# Patient Record
Sex: FEMALE | Race: BLACK OR AFRICAN AMERICAN | HISPANIC OR LATINO | URBAN - METROPOLITAN AREA
[De-identification: names, ages, dates, MRNs, and addresses within clinical notes are randomized per-mention and may not be internally consistent; named-entity substitution may affect disease eponyms.]

---

## 2017-03-21 ENCOUNTER — INPATIENT (INPATIENT)
Facility: HOSPITAL | Age: 77
LOS: 3 days | Discharge: HOME | End: 2017-03-25
Attending: INTERNAL MEDICINE | Admitting: INTERNAL MEDICINE
Payer: MEDICARE

## 2017-03-21 VITALS
DIASTOLIC BLOOD PRESSURE: 109 MMHG | HEART RATE: 61 BPM | RESPIRATION RATE: 20 BRPM | SYSTOLIC BLOOD PRESSURE: 188 MMHG | TEMPERATURE: 98 F

## 2017-03-21 DIAGNOSIS — Z98.49 CATARACT EXTRACTION STATUS, UNSPECIFIED EYE: Chronic | ICD-10-CM

## 2017-03-21 DIAGNOSIS — H40.9 UNSPECIFIED GLAUCOMA: ICD-10-CM

## 2017-03-21 DIAGNOSIS — Z41.9 ENCOUNTER FOR PROCEDURE FOR PURPOSES OTHER THAN REMEDYING HEALTH STATE, UNSPECIFIED: Chronic | ICD-10-CM

## 2017-03-21 DIAGNOSIS — Z41.8 ENCOUNTER FOR OTHER PROCEDURES FOR PURPOSES OTHER THAN REMEDYING HEALTH STATE: ICD-10-CM

## 2017-03-21 DIAGNOSIS — R55 SYNCOPE AND COLLAPSE: ICD-10-CM

## 2017-03-21 DIAGNOSIS — I10 ESSENTIAL (PRIMARY) HYPERTENSION: ICD-10-CM

## 2017-03-21 LAB
ALBUMIN SERPL ELPH-MCNC: 4.3 G/DL — SIGNIFICANT CHANGE UP (ref 3.3–5)
ALP SERPL-CCNC: 88 U/L — SIGNIFICANT CHANGE UP (ref 40–120)
ALT FLD-CCNC: 11 U/L — SIGNIFICANT CHANGE UP (ref 4–33)
APPEARANCE UR: CLEAR — SIGNIFICANT CHANGE UP
APTT BLD: 28 SEC — SIGNIFICANT CHANGE UP (ref 27.5–37.4)
AST SERPL-CCNC: 24 U/L — SIGNIFICANT CHANGE UP (ref 4–32)
BASE EXCESS BLDV CALC-SCNC: 3.8 MMOL/L — SIGNIFICANT CHANGE UP
BASOPHILS # BLD AUTO: 0.03 K/UL — SIGNIFICANT CHANGE UP (ref 0–0.2)
BASOPHILS NFR BLD AUTO: 0.4 % — SIGNIFICANT CHANGE UP (ref 0–2)
BILIRUB SERPL-MCNC: 0.4 MG/DL — SIGNIFICANT CHANGE UP (ref 0.2–1.2)
BILIRUB UR-MCNC: NEGATIVE — SIGNIFICANT CHANGE UP
BLOOD GAS VENOUS - CREATININE: 1.04 MG/DL — SIGNIFICANT CHANGE UP (ref 0.5–1.3)
BLOOD UR QL VISUAL: NEGATIVE — SIGNIFICANT CHANGE UP
BUN SERPL-MCNC: 20 MG/DL — SIGNIFICANT CHANGE UP (ref 7–23)
CALCIUM SERPL-MCNC: 9.6 MG/DL — SIGNIFICANT CHANGE UP (ref 8.4–10.5)
CHLORIDE BLDV-SCNC: 102 MMOL/L — SIGNIFICANT CHANGE UP (ref 96–108)
CHLORIDE SERPL-SCNC: 97 MMOL/L — LOW (ref 98–107)
CK MB BLD-MCNC: 1.37 NG/ML — SIGNIFICANT CHANGE UP (ref 1–4.7)
CK MB BLD-MCNC: SIGNIFICANT CHANGE UP (ref 0–2.5)
CK SERPL-CCNC: 100 U/L — SIGNIFICANT CHANGE UP (ref 25–170)
CO2 SERPL-SCNC: 25 MMOL/L — SIGNIFICANT CHANGE UP (ref 22–31)
COLOR SPEC: SIGNIFICANT CHANGE UP
CREAT SERPL-MCNC: 1.11 MG/DL — SIGNIFICANT CHANGE UP (ref 0.5–1.3)
EOSINOPHIL # BLD AUTO: 0.04 K/UL — SIGNIFICANT CHANGE UP (ref 0–0.5)
EOSINOPHIL NFR BLD AUTO: 0.6 % — SIGNIFICANT CHANGE UP (ref 0–6)
GAS PNL BLDV: 139 MMOL/L — SIGNIFICANT CHANGE UP (ref 136–146)
GLUCOSE BLDV-MCNC: 166 — HIGH (ref 70–99)
GLUCOSE SERPL-MCNC: 159 MG/DL — HIGH (ref 70–99)
GLUCOSE UR-MCNC: NEGATIVE — SIGNIFICANT CHANGE UP
HBA1C BLD-MCNC: 6.2 % — HIGH (ref 4–5.6)
HCO3 BLDV-SCNC: 25 MMOL/L — SIGNIFICANT CHANGE UP (ref 20–27)
HCT VFR BLD CALC: 40.3 % — SIGNIFICANT CHANGE UP (ref 34.5–45)
HCT VFR BLDV CALC: 41.3 % — SIGNIFICANT CHANGE UP (ref 34.5–45)
HGB BLD-MCNC: 13.1 G/DL — SIGNIFICANT CHANGE UP (ref 11.5–15.5)
HGB BLDV-MCNC: 13.4 G/DL — SIGNIFICANT CHANGE UP (ref 11.5–15.5)
HYALINE CASTS # UR AUTO: SIGNIFICANT CHANGE UP (ref 0–?)
IMM GRANULOCYTES NFR BLD AUTO: 0.1 % — SIGNIFICANT CHANGE UP (ref 0–1.5)
INR BLD: 1.07 — SIGNIFICANT CHANGE UP (ref 0.88–1.17)
KETONES UR-MCNC: NEGATIVE — SIGNIFICANT CHANGE UP
LACTATE BLDV-MCNC: 2.1 MMOL/L — HIGH (ref 0.5–2)
LEUKOCYTE ESTERASE UR-ACNC: NEGATIVE — SIGNIFICANT CHANGE UP
LYMPHOCYTES # BLD AUTO: 2.12 K/UL — SIGNIFICANT CHANGE UP (ref 1–3.3)
LYMPHOCYTES # BLD AUTO: 31.2 % — SIGNIFICANT CHANGE UP (ref 13–44)
MCHC RBC-ENTMCNC: 29.5 PG — SIGNIFICANT CHANGE UP (ref 27–34)
MCHC RBC-ENTMCNC: 32.5 % — SIGNIFICANT CHANGE UP (ref 32–36)
MCV RBC AUTO: 90.8 FL — SIGNIFICANT CHANGE UP (ref 80–100)
MONOCYTES # BLD AUTO: 0.42 K/UL — SIGNIFICANT CHANGE UP (ref 0–0.9)
MONOCYTES NFR BLD AUTO: 6.2 % — SIGNIFICANT CHANGE UP (ref 2–14)
MUCOUS THREADS # UR AUTO: SIGNIFICANT CHANGE UP
NEUTROPHILS # BLD AUTO: 4.18 K/UL — SIGNIFICANT CHANGE UP (ref 1.8–7.4)
NEUTROPHILS NFR BLD AUTO: 61.5 % — SIGNIFICANT CHANGE UP (ref 43–77)
NITRITE UR-MCNC: NEGATIVE — SIGNIFICANT CHANGE UP
PCO2 BLDV: 51 MMHG — SIGNIFICANT CHANGE UP (ref 41–51)
PH BLDV: 7.37 PH — SIGNIFICANT CHANGE UP (ref 7.32–7.43)
PH UR: 7 — SIGNIFICANT CHANGE UP (ref 4.6–8)
PLATELET # BLD AUTO: 269 K/UL — SIGNIFICANT CHANGE UP (ref 150–400)
PMV BLD: 11.1 FL — SIGNIFICANT CHANGE UP (ref 7–13)
PO2 BLDV: < 24 MMHG — LOW (ref 35–40)
POTASSIUM BLDV-SCNC: 3.5 MMOL/L — SIGNIFICANT CHANGE UP (ref 3.4–4.5)
POTASSIUM SERPL-MCNC: 4.1 MMOL/L — SIGNIFICANT CHANGE UP (ref 3.5–5.3)
POTASSIUM SERPL-SCNC: 4.1 MMOL/L — SIGNIFICANT CHANGE UP (ref 3.5–5.3)
PROT SERPL-MCNC: 8.2 G/DL — SIGNIFICANT CHANGE UP (ref 6–8.3)
PROT UR-MCNC: NEGATIVE — SIGNIFICANT CHANGE UP
PROTHROM AB SERPL-ACNC: 12 SEC — SIGNIFICANT CHANGE UP (ref 9.8–13.1)
RBC # BLD: 4.44 M/UL — SIGNIFICANT CHANGE UP (ref 3.8–5.2)
RBC # FLD: 12.4 % — SIGNIFICANT CHANGE UP (ref 10.3–14.5)
RBC CASTS # UR COMP ASSIST: SIGNIFICANT CHANGE UP (ref 0–?)
SAO2 % BLDV: 25.8 % — LOW (ref 60–85)
SODIUM SERPL-SCNC: 140 MMOL/L — SIGNIFICANT CHANGE UP (ref 135–145)
SP GR SPEC: 1.01 — SIGNIFICANT CHANGE UP (ref 1–1.03)
SQUAMOUS # UR AUTO: SIGNIFICANT CHANGE UP
TROPONIN T SERPL-MCNC: < 0.06 NG/ML — SIGNIFICANT CHANGE UP (ref 0–0.06)
TROPONIN T SERPL-MCNC: < 0.06 NG/ML — SIGNIFICANT CHANGE UP (ref 0–0.06)
TSH SERPL-MCNC: 1.3 UIU/ML — SIGNIFICANT CHANGE UP (ref 0.27–4.2)
UROBILINOGEN FLD QL: NORMAL E.U. — SIGNIFICANT CHANGE UP (ref 0.1–0.2)
WBC # BLD: 6.8 K/UL — SIGNIFICANT CHANGE UP (ref 3.8–10.5)
WBC # FLD AUTO: 6.8 K/UL — SIGNIFICANT CHANGE UP (ref 3.8–10.5)
WBC UR QL: SIGNIFICANT CHANGE UP (ref 0–?)

## 2017-03-21 PROCEDURE — 71020: CPT | Mod: 26

## 2017-03-21 RX ORDER — ASPIRIN/CALCIUM CARB/MAGNESIUM 324 MG
81 TABLET ORAL DAILY
Qty: 0 | Refills: 0 | Status: DISCONTINUED | OUTPATIENT
Start: 2017-03-21 | End: 2017-03-25

## 2017-03-21 RX ORDER — TIMOLOL 0.5 %
1 DROPS OPHTHALMIC (EYE) EVERY 12 HOURS
Qty: 0 | Refills: 0 | Status: DISCONTINUED | OUTPATIENT
Start: 2017-03-21 | End: 2017-03-21

## 2017-03-21 RX ORDER — TIMOLOL 0.5 %
1 DROPS OPHTHALMIC (EYE) DAILY
Qty: 0 | Refills: 0 | Status: DISCONTINUED | OUTPATIENT
Start: 2017-03-21 | End: 2017-03-25

## 2017-03-21 RX ORDER — BRIMONIDINE TARTRATE, TIMOLOL MALEATE 2; 5 MG/ML; MG/ML
1 SOLUTION/ DROPS OPHTHALMIC
Qty: 0 | Refills: 0 | COMMUNITY

## 2017-03-21 RX ORDER — LATANOPROST 0.05 MG/ML
1 SOLUTION/ DROPS OPHTHALMIC; TOPICAL AT BEDTIME
Qty: 0 | Refills: 0 | Status: DISCONTINUED | OUTPATIENT
Start: 2017-03-21 | End: 2017-03-25

## 2017-03-21 RX ORDER — HYDRALAZINE HCL 50 MG
10 TABLET ORAL ONCE
Qty: 0 | Refills: 0 | Status: COMPLETED | OUTPATIENT
Start: 2017-03-21 | End: 2017-03-21

## 2017-03-21 RX ORDER — HYDRALAZINE HCL 50 MG
5 TABLET ORAL ONCE
Qty: 0 | Refills: 0 | Status: COMPLETED | OUTPATIENT
Start: 2017-03-21 | End: 2017-03-21

## 2017-03-21 RX ORDER — OFLOXACIN 0.3 %
1 DROPS OPHTHALMIC (EYE)
Qty: 1 | Refills: 0 | OUTPATIENT
Start: 2017-03-21 | End: 2017-03-28

## 2017-03-21 RX ORDER — OFLOXACIN 0.3 %
1 DROPS OPHTHALMIC (EYE) DAILY
Qty: 0 | Refills: 0 | Status: DISCONTINUED | OUTPATIENT
Start: 2017-03-21 | End: 2017-03-25

## 2017-03-21 RX ORDER — BRIMONIDINE TARTRATE, TIMOLOL MALEATE 2; 5 MG/ML; MG/ML
1 SOLUTION/ DROPS OPHTHALMIC
Qty: 1 | Refills: 0 | OUTPATIENT
Start: 2017-03-21 | End: 2017-04-20

## 2017-03-21 RX ORDER — OFLOXACIN 0.3 %
1 DROPS OPHTHALMIC (EYE)
Qty: 0 | Refills: 0 | COMMUNITY

## 2017-03-21 RX ORDER — BRIMONIDINE TARTRATE 2 MG/MG
1 SOLUTION/ DROPS OPHTHALMIC DAILY
Qty: 0 | Refills: 0 | Status: DISCONTINUED | OUTPATIENT
Start: 2017-03-21 | End: 2017-03-25

## 2017-03-21 RX ORDER — SODIUM CHLORIDE 9 MG/ML
1000 INJECTION INTRAMUSCULAR; INTRAVENOUS; SUBCUTANEOUS
Qty: 0 | Refills: 0 | Status: COMPLETED | OUTPATIENT
Start: 2017-03-21 | End: 2017-03-21

## 2017-03-21 RX ORDER — AMLODIPINE BESYLATE 2.5 MG/1
5 TABLET ORAL DAILY
Qty: 0 | Refills: 0 | Status: DISCONTINUED | OUTPATIENT
Start: 2017-03-21 | End: 2017-03-24

## 2017-03-21 RX ORDER — ENOXAPARIN SODIUM 100 MG/ML
40 INJECTION SUBCUTANEOUS EVERY 24 HOURS
Qty: 0 | Refills: 0 | Status: DISCONTINUED | OUTPATIENT
Start: 2017-03-21 | End: 2017-03-25

## 2017-03-21 RX ORDER — BRIMONIDINE TARTRATE 2 MG/MG
1 SOLUTION/ DROPS OPHTHALMIC EVERY 12 HOURS
Qty: 0 | Refills: 0 | Status: DISCONTINUED | OUTPATIENT
Start: 2017-03-21 | End: 2017-03-21

## 2017-03-21 RX ADMIN — LATANOPROST 1 DROP(S): 0.05 SOLUTION/ DROPS OPHTHALMIC; TOPICAL at 23:15

## 2017-03-21 RX ADMIN — AMLODIPINE BESYLATE 5 MILLIGRAM(S): 2.5 TABLET ORAL at 22:15

## 2017-03-21 RX ADMIN — Medication 10 MILLIGRAM(S): at 10:16

## 2017-03-21 RX ADMIN — ENOXAPARIN SODIUM 40 MILLIGRAM(S): 100 INJECTION SUBCUTANEOUS at 22:41

## 2017-03-21 RX ADMIN — Medication 5 MILLIGRAM(S): at 22:15

## 2017-03-21 RX ADMIN — SODIUM CHLORIDE 75 MILLILITER(S): 9 INJECTION INTRAMUSCULAR; INTRAVENOUS; SUBCUTANEOUS at 16:45

## 2017-03-21 NOTE — H&P ADULT. - PSH
No significant past surgical history Cataract extraction status, unspecified eye    Elective surgical procedure  glaucoma repair

## 2017-03-21 NOTE — ED ADULT TRIAGE NOTE - CHIEF COMPLAINT QUOTE
Pt states felt dizzy this am and almost passed out/ Pt denies any chest pain or sob. finger stick 134 in triage. Pt has HTN states ran out of medication.

## 2017-03-21 NOTE — H&P ADULT. - HISTORY OF PRESENT ILLNESS
Pt is creole-speaking and insisted history be obtained from her son:    78 y/o female with PMHx of HTN, for which she takes no medication, c/o sudden onset of dizziness and "near-syncopal" episode at 5am this morning. Pt was standing in the kitchen when the dizziness occurred and was caught by her nephew as she was falling and seated onto the kitchen chair where she demonstrated symptoms of lethargy, weakness, and nausea. Pt denies losing consciousness and was alert and able to answer questions during the episode. Pt was brought in by her son at 6:30am when she went to the bathroom and still showed signs of weakness. Pt has since improved and feels "back to normal." Pt denies ever experiencing such symptoms before. Pt denies CP, palpitations, SOB, ROCHA, diaphoresis, HAs, visual changes, fever, cough, chills, vomiting, diarrhea, constipation, dysuria, hematuria, polyuria, hematochezia, and melena. Pt denies recent travel and sick contacts. Pt is creole-speaking and insisted history be obtained from her son/caregiver (Timoteo Butler [phone]):    76 y/o female with PMHx of Glaucoma and Cataracts, does not have PCP established.  c/o sudden onset of dizziness and almost passed out at 5am this morning. Pt was standing in the kitchen when the dizziness occurred and was caught by her nephew as she was falling. She was seated onto the kitchen chair where she demonstrated symptoms of lethargy, weakness, and nausea. Pt denies losing consciousness and was alert and able to answer questions during the episode. Pt was brought in by her son at 6:30am when she went to the bathroom and still showed signs of weakness and unsteadiness on her feet. Pt has since improved and feels "back to normal." Pt denies ever experiencing such symptoms before. Pt denies CP, palpitations, SOB, ROCHA, diaphoresis, HAs, visual changes, fever, cough, chills, vomiting, diarrhea, constipation, dysuria, hematuria, polyuria, hematochezia, and melena. Pt denies recent travel and sick contacts.     In E.D. pt found to be hypertensive with /93. She was given IV Hydralazine 10mg x1 where her BP improved 157/59 and symptoms resolved.

## 2017-03-21 NOTE — H&P ADULT. - MUSCULOSKELETAL
details… detailed exam normal/no joint erythema/normal strength/ROM intact/no calf tenderness/no joint warmth/no joint swelling

## 2017-03-21 NOTE — H&P ADULT. - NEGATIVE CARDIOVASCULAR SYMPTOMS
no paroxysmal nocturnal dyspnea/no claudication/no dyspnea on exertion/no palpitations/no peripheral edema/no chest pain/no orthopnea

## 2017-03-21 NOTE — ED PROVIDER NOTE - MEDICAL DECISION MAKING DETAILS
77F w/ dizziness and EKG with LBBB (no previous for comparison) concerning for cardiac etiology. HEART score at least 6 (pre-troponin). Plan for basic and cardiac labs, EKG, CXR, UA. Admission.

## 2017-03-21 NOTE — ED PROVIDER NOTE - CHPI ED SYMPTOMS NEG
no shortness of breath/no chills/no nausea/no syncope/no vomiting/no fever/no back pain/no diaphoresis/no cough

## 2017-03-21 NOTE — H&P ADULT. - NEGATIVE GENERAL SYMPTOMS
no malaise/no polyphagia/no weight loss/no fever/no weight gain/no chills/no polydipsia/no sweating/no anorexia/no polyuria

## 2017-03-21 NOTE — H&P ADULT. - NEGATIVE ENMT SYMPTOMS
no tinnitus/no vertigo/no nasal discharge/no sinus symptoms/no nose bleeds/no ear pain/no post-nasal discharge/no recurrent cold sores/no nasal obstruction/no hearing difficulty/no nasal congestion

## 2017-03-21 NOTE — ED PROVIDER NOTE - OBJECTIVE STATEMENT
77F w/ hx of uncontrolled HTN p/w acute onset of dizziness, weakness and nausea this AM. Pt developed onset of symptoms while standing making coffee at home. Felt like she was going to faint, and was helped down to a chair by her grandson. Pt appeared to slump over in the chair, but never lost consciousness. No change in routine or baseline function level prior to this episode. Pt denies HA, chest pain, SOB, one sided numbness/weakness, abdominal pain, diarrhea, jaw/arm pain.     Pt has been prescribed HTN med (doesn't remember name) but doesn't take. Pt does not have reg PMD follow up. No previous cardiac or neuro hx. Normally fully functional at home. Now feels weak walking. Never smoker, drinker. No DM hx.

## 2017-03-21 NOTE — H&P ADULT. - NEGATIVE GASTROINTESTINAL SYMPTOMS
no abdominal pain/no flatulence/no nausea/no melena/no hematochezia/no diarrhea/no vomiting/no jaundice/no hiccoughs/no constipation/no steatorrhea/no change in bowel habits

## 2017-03-21 NOTE — ED PROVIDER NOTE - PROGRESS NOTE DETAILS
Pt has remained stable. First set of CE wnl. Will plan to admit to tele for further enzyme trending and stress test. Discussed with Dr. Berkowitz who agreed to accept patient.

## 2017-03-21 NOTE — ED PROVIDER NOTE - ATTENDING CONTRIBUTION TO CARE
Dr. Bae:  I have personally performed a face to face bedside history and physical examination of this patient. I have discussed the history, examination, review of systems, assessment and plan of management with the resident. I have reviewed the electronic medical record and amended it to reflect my history, review of systems, physical exam, assessment and plan.    77F h/o HTN not on meds presents with two near syncopal episodes at home this morning while standing.  Felt lightheaded.  Denies fever/chills, cp, sob, n/v/d.  No prior history of similar episodes.    Exam:  - nad  - rrr  - ctab  - abd soft, ntnd  - no pedal edema    A/P  - near syncope and LBBB on EKG  - patient noted to rafa to 40's while in ED on telemetry  - cbc, cmp, trop  - ekg  - telemetry admit

## 2017-03-21 NOTE — ED ADULT NURSE NOTE - OBJECTIVE STATEMENT
pt received a&ox3, pt brought in by son for near syncopal episode, pt was eating breakfast stood up and felt dizzy and nauseous and felt like she was going to fall and sat down, pt denies cp/sob/vd/urinary sypmtoms, before or after the episode, pt appears comfortable and to be in NAD, md at bedside evaluating, vss as reported, 20 gauge IV placed in right ac, labs drawn and sent, pt placed on monitor, will continue to monitor.

## 2017-03-21 NOTE — H&P ADULT. - ASSESSMENT
78 y/o female c/o of sudden onset dizziness r/o AS 78 yo female p/w generalized weakness, dizziness, lightheadedness and ataxia found to have uncontrolled /93 where symptoms improved after IV hydralazine admitted to tele for near syncope, r/o ACS in the setting of HTN Urgency.     +Near Syncope-->El x2, TTE, IV fluid h ydration  +HTN Urgency 76 yo female p/w generalized weakness, dizziness, lightheadedness and ataxia found to have uncontrolled /93 where symptoms improved after IV hydralazine admitted to tele for near syncope, r/o ACS in the setting of HTN Urgency.     +Near Syncope-->El x2, TTE, IV fluid hydration  +HTN Urgency

## 2017-03-21 NOTE — PATIENT PROFILE ADULT. - VISION (WITH CORRECTIVE LENSES IF THE PATIENT USUALLY WEARS THEM):
R eye blind/Partially impaired: cannot see medication labels or newsprint, but can see obstacles in path, and the surrounding layout; can count fingers at arm's length

## 2017-03-21 NOTE — H&P ADULT. - GASTROINTESTINAL DETAILS
nontender/no distention/no organomegaly/no rigidity/no bruit/no rebound tenderness/soft/normal/no masses palpable/no guarding/bowel sounds normal

## 2017-03-21 NOTE — ED ADULT NURSE REASSESSMENT NOTE - NS ED NURSE REASSESS COMMENT FT1
RN Break Coverage: received report on pt. pt presents awake alert, nonenglish speaking, offers no complaints. skin warm, dry, intact, appropriate for race. respirations even unlabored. denies cp or sob. denies n/v/d denies fever or chills. iv site clean, dry, intact, patent. NS0.9% initiated to infuse at 75ml/hr. denies pain or distress. safety maintained, family at bedside.

## 2017-03-21 NOTE — H&P ADULT. - PROBLEM SELECTOR PLAN 1
Order Echo (KERRIE), stress test, orthostatics, CBC, CMP, TSH  Fall precautions tele monitor   cardiac enzymes x 2  Orthostatic blood pressures  DASH diet  started on ecotrin 81mg po daily  TTE  consider NST pharm  Optimize BP  IV Fluid hydration  PT eval

## 2017-03-21 NOTE — H&P ADULT. - NEGATIVE NEUROLOGICAL SYMPTOMS
no headache/no generalized seizures/no transient paralysis/no confusion/no difficulty walking/no tremors/no loss of consciousness/no loss of sensation/no paresthesias/no syncope

## 2017-03-22 LAB
BUN SERPL-MCNC: 19 MG/DL — SIGNIFICANT CHANGE UP (ref 7–23)
CALCIUM SERPL-MCNC: 9.4 MG/DL — SIGNIFICANT CHANGE UP (ref 8.4–10.5)
CHLORIDE SERPL-SCNC: 102 MMOL/L — SIGNIFICANT CHANGE UP (ref 98–107)
CO2 SERPL-SCNC: 23 MMOL/L — SIGNIFICANT CHANGE UP (ref 22–31)
CREAT SERPL-MCNC: 0.99 MG/DL — SIGNIFICANT CHANGE UP (ref 0.5–1.3)
GLUCOSE SERPL-MCNC: 131 MG/DL — HIGH (ref 70–99)
HCT VFR BLD CALC: 39.1 % — SIGNIFICANT CHANGE UP (ref 34.5–45)
HGB BLD-MCNC: 12.7 G/DL — SIGNIFICANT CHANGE UP (ref 11.5–15.5)
MCHC RBC-ENTMCNC: 29.3 PG — SIGNIFICANT CHANGE UP (ref 27–34)
MCHC RBC-ENTMCNC: 32.5 % — SIGNIFICANT CHANGE UP (ref 32–36)
MCV RBC AUTO: 90.1 FL — SIGNIFICANT CHANGE UP (ref 80–100)
PLATELET # BLD AUTO: 254 K/UL — SIGNIFICANT CHANGE UP (ref 150–400)
PMV BLD: 10.6 FL — SIGNIFICANT CHANGE UP (ref 7–13)
POTASSIUM SERPL-MCNC: 3.8 MMOL/L — SIGNIFICANT CHANGE UP (ref 3.5–5.3)
POTASSIUM SERPL-SCNC: 3.8 MMOL/L — SIGNIFICANT CHANGE UP (ref 3.5–5.3)
RBC # BLD: 4.34 M/UL — SIGNIFICANT CHANGE UP (ref 3.8–5.2)
RBC # FLD: 12.4 % — SIGNIFICANT CHANGE UP (ref 10.3–14.5)
SODIUM SERPL-SCNC: 143 MMOL/L — SIGNIFICANT CHANGE UP (ref 135–145)
WBC # BLD: 8.16 K/UL — SIGNIFICANT CHANGE UP (ref 3.8–10.5)
WBC # FLD AUTO: 8.16 K/UL — SIGNIFICANT CHANGE UP (ref 3.8–10.5)

## 2017-03-22 PROCEDURE — 70450 CT HEAD/BRAIN W/O DYE: CPT | Mod: 26

## 2017-03-22 RX ORDER — METOPROLOL TARTRATE 50 MG
25 TABLET ORAL ONCE
Qty: 0 | Refills: 0 | Status: COMPLETED | OUTPATIENT
Start: 2017-03-22 | End: 2017-03-22

## 2017-03-22 RX ORDER — METOPROLOL TARTRATE 50 MG
25 TABLET ORAL
Qty: 0 | Refills: 0 | Status: DISCONTINUED | OUTPATIENT
Start: 2017-03-22 | End: 2017-03-24

## 2017-03-22 RX ORDER — POTASSIUM CHLORIDE 20 MEQ
40 PACKET (EA) ORAL ONCE
Qty: 0 | Refills: 0 | Status: COMPLETED | OUTPATIENT
Start: 2017-03-22 | End: 2017-03-22

## 2017-03-22 RX ORDER — LANOLIN ALCOHOL/MO/W.PET/CERES
3 CREAM (GRAM) TOPICAL ONCE
Qty: 0 | Refills: 0 | Status: COMPLETED | OUTPATIENT
Start: 2017-03-22 | End: 2017-03-22

## 2017-03-22 RX ADMIN — Medication 81 MILLIGRAM(S): at 11:29

## 2017-03-22 RX ADMIN — Medication 25 MILLIGRAM(S): at 22:40

## 2017-03-22 RX ADMIN — Medication 1 DROP(S): at 11:39

## 2017-03-22 RX ADMIN — Medication 3 MILLIGRAM(S): at 22:14

## 2017-03-22 RX ADMIN — AMLODIPINE BESYLATE 5 MILLIGRAM(S): 2.5 TABLET ORAL at 05:19

## 2017-03-22 RX ADMIN — Medication 25 MILLIGRAM(S): at 12:23

## 2017-03-22 RX ADMIN — Medication 40 MILLIEQUIVALENT(S): at 11:29

## 2017-03-22 RX ADMIN — LATANOPROST 1 DROP(S): 0.05 SOLUTION/ DROPS OPHTHALMIC; TOPICAL at 22:12

## 2017-03-22 RX ADMIN — BRIMONIDINE TARTRATE 1 DROP(S): 2 SOLUTION/ DROPS OPHTHALMIC at 11:39

## 2017-03-22 RX ADMIN — ENOXAPARIN SODIUM 40 MILLIGRAM(S): 100 INJECTION SUBCUTANEOUS at 22:12

## 2017-03-22 RX ADMIN — Medication 25 MILLIGRAM(S): at 17:18

## 2017-03-23 PROCEDURE — 70549 MR ANGIOGRAPH NECK W/O&W/DYE: CPT | Mod: 26

## 2017-03-23 PROCEDURE — 93312 ECHO TRANSESOPHAGEAL: CPT | Mod: 26

## 2017-03-23 PROCEDURE — 99222 1ST HOSP IP/OBS MODERATE 55: CPT

## 2017-03-23 PROCEDURE — 70551 MRI BRAIN STEM W/O DYE: CPT | Mod: 26

## 2017-03-23 PROCEDURE — 93306 TTE W/DOPPLER COMPLETE: CPT | Mod: 26

## 2017-03-23 RX ORDER — ATORVASTATIN CALCIUM 80 MG/1
40 TABLET, FILM COATED ORAL AT BEDTIME
Qty: 0 | Refills: 0 | Status: DISCONTINUED | OUTPATIENT
Start: 2017-03-23 | End: 2017-03-25

## 2017-03-23 RX ORDER — LANOLIN ALCOHOL/MO/W.PET/CERES
3 CREAM (GRAM) TOPICAL AT BEDTIME
Qty: 0 | Refills: 0 | Status: COMPLETED | OUTPATIENT
Start: 2017-03-23 | End: 2017-03-23

## 2017-03-23 RX ORDER — AMLODIPINE BESYLATE 2.5 MG/1
5 TABLET ORAL ONCE
Qty: 0 | Refills: 0 | Status: COMPLETED | OUTPATIENT
Start: 2017-03-23 | End: 2017-03-23

## 2017-03-23 RX ADMIN — AMLODIPINE BESYLATE 5 MILLIGRAM(S): 2.5 TABLET ORAL at 23:18

## 2017-03-23 RX ADMIN — Medication 1 DROP(S): at 12:07

## 2017-03-23 RX ADMIN — Medication 3 MILLIGRAM(S): at 23:18

## 2017-03-23 RX ADMIN — ENOXAPARIN SODIUM 40 MILLIGRAM(S): 100 INJECTION SUBCUTANEOUS at 23:19

## 2017-03-23 RX ADMIN — BRIMONIDINE TARTRATE 1 DROP(S): 2 SOLUTION/ DROPS OPHTHALMIC at 12:08

## 2017-03-23 RX ADMIN — ATORVASTATIN CALCIUM 40 MILLIGRAM(S): 80 TABLET, FILM COATED ORAL at 23:22

## 2017-03-23 RX ADMIN — Medication 25 MILLIGRAM(S): at 05:28

## 2017-03-23 RX ADMIN — Medication 81 MILLIGRAM(S): at 12:29

## 2017-03-23 RX ADMIN — LATANOPROST 1 DROP(S): 0.05 SOLUTION/ DROPS OPHTHALMIC; TOPICAL at 23:19

## 2017-03-23 RX ADMIN — Medication 1 DROP(S): at 12:08

## 2017-03-23 RX ADMIN — AMLODIPINE BESYLATE 5 MILLIGRAM(S): 2.5 TABLET ORAL at 05:28

## 2017-03-23 RX ADMIN — Medication 25 MILLIGRAM(S): at 17:27

## 2017-03-24 LAB
BASOPHILS # BLD AUTO: 0.03 K/UL — SIGNIFICANT CHANGE UP (ref 0–0.2)
BASOPHILS NFR BLD AUTO: 0.3 % — SIGNIFICANT CHANGE UP (ref 0–2)
BUN SERPL-MCNC: 21 MG/DL — SIGNIFICANT CHANGE UP (ref 7–23)
CALCIUM SERPL-MCNC: 9.7 MG/DL — SIGNIFICANT CHANGE UP (ref 8.4–10.5)
CHLORIDE SERPL-SCNC: 104 MMOL/L — SIGNIFICANT CHANGE UP (ref 98–107)
CO2 SERPL-SCNC: 21 MMOL/L — LOW (ref 22–31)
CREAT SERPL-MCNC: 0.84 MG/DL — SIGNIFICANT CHANGE UP (ref 0.5–1.3)
EOSINOPHIL # BLD AUTO: 0.11 K/UL — SIGNIFICANT CHANGE UP (ref 0–0.5)
EOSINOPHIL NFR BLD AUTO: 1.3 % — SIGNIFICANT CHANGE UP (ref 0–6)
GLUCOSE SERPL-MCNC: 122 MG/DL — HIGH (ref 70–99)
HCT VFR BLD CALC: 39.3 % — SIGNIFICANT CHANGE UP (ref 34.5–45)
HGB BLD-MCNC: 12.7 G/DL — SIGNIFICANT CHANGE UP (ref 11.5–15.5)
IMM GRANULOCYTES NFR BLD AUTO: 0.1 % — SIGNIFICANT CHANGE UP (ref 0–1.5)
LYMPHOCYTES # BLD AUTO: 4.58 K/UL — HIGH (ref 1–3.3)
LYMPHOCYTES # BLD AUTO: 53 % — HIGH (ref 13–44)
MAGNESIUM SERPL-MCNC: 1.8 MG/DL — SIGNIFICANT CHANGE UP (ref 1.6–2.6)
MCHC RBC-ENTMCNC: 28.9 PG — SIGNIFICANT CHANGE UP (ref 27–34)
MCHC RBC-ENTMCNC: 32.3 % — SIGNIFICANT CHANGE UP (ref 32–36)
MCV RBC AUTO: 89.5 FL — SIGNIFICANT CHANGE UP (ref 80–100)
MONOCYTES # BLD AUTO: 0.87 K/UL — SIGNIFICANT CHANGE UP (ref 0–0.9)
MONOCYTES NFR BLD AUTO: 10.1 % — SIGNIFICANT CHANGE UP (ref 2–14)
NEUTROPHILS # BLD AUTO: 3.04 K/UL — SIGNIFICANT CHANGE UP (ref 1.8–7.4)
NEUTROPHILS NFR BLD AUTO: 35.2 % — LOW (ref 43–77)
PLATELET # BLD AUTO: 244 K/UL — SIGNIFICANT CHANGE UP (ref 150–400)
PMV BLD: 10.6 FL — SIGNIFICANT CHANGE UP (ref 7–13)
POTASSIUM SERPL-MCNC: 4 MMOL/L — SIGNIFICANT CHANGE UP (ref 3.5–5.3)
POTASSIUM SERPL-SCNC: 4 MMOL/L — SIGNIFICANT CHANGE UP (ref 3.5–5.3)
RBC # BLD: 4.39 M/UL — SIGNIFICANT CHANGE UP (ref 3.8–5.2)
RBC # FLD: 12.4 % — SIGNIFICANT CHANGE UP (ref 10.3–14.5)
SODIUM SERPL-SCNC: 142 MMOL/L — SIGNIFICANT CHANGE UP (ref 135–145)
WBC # BLD: 8.64 K/UL — SIGNIFICANT CHANGE UP (ref 3.8–10.5)
WBC # FLD AUTO: 8.64 K/UL — SIGNIFICANT CHANGE UP (ref 3.8–10.5)

## 2017-03-24 RX ORDER — NIFEDIPINE 30 MG
60 TABLET, EXTENDED RELEASE 24 HR ORAL DAILY
Qty: 0 | Refills: 0 | Status: DISCONTINUED | OUTPATIENT
Start: 2017-03-24 | End: 2017-03-24

## 2017-03-24 RX ORDER — AMLODIPINE BESYLATE 2.5 MG/1
10 TABLET ORAL DAILY
Qty: 0 | Refills: 0 | Status: DISCONTINUED | OUTPATIENT
Start: 2017-03-24 | End: 2017-03-24

## 2017-03-24 RX ORDER — NIFEDIPINE 30 MG
60 TABLET, EXTENDED RELEASE 24 HR ORAL DAILY
Qty: 0 | Refills: 0 | Status: DISCONTINUED | OUTPATIENT
Start: 2017-03-24 | End: 2017-03-25

## 2017-03-24 RX ORDER — METOPROLOL TARTRATE 50 MG
50 TABLET ORAL
Qty: 0 | Refills: 0 | Status: DISCONTINUED | OUTPATIENT
Start: 2017-03-24 | End: 2017-03-24

## 2017-03-24 RX ORDER — LABETALOL HCL 100 MG
100 TABLET ORAL THREE TIMES A DAY
Qty: 0 | Refills: 0 | Status: DISCONTINUED | OUTPATIENT
Start: 2017-03-24 | End: 2017-03-25

## 2017-03-24 RX ORDER — LABETALOL HCL 100 MG
100 TABLET ORAL THREE TIMES A DAY
Qty: 0 | Refills: 0 | Status: DISCONTINUED | OUTPATIENT
Start: 2017-03-24 | End: 2017-03-24

## 2017-03-24 RX ADMIN — Medication 100 MILLIGRAM(S): at 22:30

## 2017-03-24 RX ADMIN — Medication 1 DROP(S): at 12:43

## 2017-03-24 RX ADMIN — ENOXAPARIN SODIUM 40 MILLIGRAM(S): 100 INJECTION SUBCUTANEOUS at 22:27

## 2017-03-24 RX ADMIN — Medication 1 DROP(S): at 12:42

## 2017-03-24 RX ADMIN — LATANOPROST 1 DROP(S): 0.05 SOLUTION/ DROPS OPHTHALMIC; TOPICAL at 22:27

## 2017-03-24 RX ADMIN — AMLODIPINE BESYLATE 5 MILLIGRAM(S): 2.5 TABLET ORAL at 05:58

## 2017-03-24 RX ADMIN — Medication 25 MILLIGRAM(S): at 05:58

## 2017-03-24 RX ADMIN — Medication 81 MILLIGRAM(S): at 12:42

## 2017-03-24 RX ADMIN — ATORVASTATIN CALCIUM 40 MILLIGRAM(S): 80 TABLET, FILM COATED ORAL at 22:27

## 2017-03-24 RX ADMIN — BRIMONIDINE TARTRATE 1 DROP(S): 2 SOLUTION/ DROPS OPHTHALMIC at 12:42

## 2017-03-24 NOTE — DISCHARGE NOTE ADULT - PATIENT PORTAL LINK FT
“You can access the [employer] Patient Portal, offered by [facility], by registering with the following website: [email or web address]”

## 2017-03-24 NOTE — DISCHARGE NOTE ADULT - VISION (WITH CORRECTIVE LENSES IF THE PATIENT USUALLY WEARS THEM):
Partially impaired: cannot see medication labels or newsprint, but can see obstacles in path, and the surrounding layout; can count fingers at arm's length/R eye blind

## 2017-03-24 NOTE — DISCHARGE NOTE ADULT - CARE PROVIDER_API CALL
Alex Palmer (MD), Cardiac Electrophysiology; Cardiovascular Disease; Internal Medicine  [id number] [address] [place] [id number]  Phone: [phone]  Fax: [phone]

## 2017-03-24 NOTE — DISCHARGE NOTE ADULT - HOSPITAL COURSE
78 yo female p/w generalized weakness, dizziness, lightheadedness and ataxia found to have uncontrolled /93 where symptoms improved after IV hydralazine admitted to tele for near syncope, r/o ACS in the setting of HTN Urgency.   EKG: Sinus Bradycardia @ 59 bpm with LBBB, LVH.  CXR: neg  UA:neg  El: Trop Neg x 2  3/21 Cardio: Pre syncop, uncontrolled Htn, LBBB.  Check headt CT, Echo, Stress test, Neuro eval.  3/21 Neuro eval: Check CT head, if neg may need EEG.          3/22-  Medicine- HTN increase norvasc to 10mg daily ( d/w attending keep norvasc at present dose and start on bblocker)   3/22- CT head- no hemorrhage, hypodensity involving anterior limb of left internal capsule representing acute to subacute infarct           3/23 KERRIE:   1. Mitral annular calcification and calcified mitral  leaflets with normal diastolic opening. Mild mitral  regurgitation.  2. No left atrial or left atrial appendage thrombus. Normal  left atrium.  3. Normal left ventricular systolic function. No segmental  wall motion abnormalities.  4. Normal right ventricular size and function.  5. Agitated saline injection demonstrates no evidence of a  patent foramen ovale.  3/23 Cardio: HTN- better controlled, CVA- KERRIE normal, f/u neuro/ MRI/MRA, check EP eval for ILR, ASA/Statin.  3/23 Neuro: CTh showed subacute CVA, check MRI/MRA                                             3/23 EP: refused ILR, agreeable to cardio net  3/24 Med: HTN-->would increase Metoprolol to 50mg BID.   3/24 Cardio: Change Metoprolol to Labetalol, change Norvasc to Nifedipine. EP f/u for Cardionet. D/C planning. 76 yo female p/w generalized weakness, dizziness, lightheadedness and ataxia found to have uncontrolled /93 where symptoms improved after IV hydralazine admitted to tele for near syncope, r/o ACS in the setting of HTN Urgency.   EKG: Sinus Bradycardia @ 59 bpm with LBBB, LVH.  CXR: neg UA: neg  El: Trop Neg x 2  3/21 Cardio: Pre syncope, uncontrolled Htn, LBBB.  Check head  CT, Echo, Stress test, Neuro eval.  3/21 Neuro eval: Check CT head, if neg may need EEG.          3/22-  Medicine- HTN increase norvasc to 10mg daily ( d/w attending keep norvasc at present dose and start on b blocker)   3/22- CT head- no hemorrhage, hypodensity involving anterior limb of left internal capsule representing acute to subacute infarct           3/23 KERRIE:  Mitral annular calcification and calcified mitral leaflets with normal diastolic opening. Mild mitral  regurgitation. No left atrial or left atrial appendage thrombus. Normal left atrium. Normal left ventricular systolic function. No segmental wall motion abnormalities. Normal right ventricular size and function. Agitated saline injection demonstrates no evidence of a patent foramen ovale.  3/23 Cardio: HTN- better controlled, CVA- KERRIE normal, f/u neuro/ MRI/MRA, check EP eval for ILR, ASA/Statin.  3/23 Neuro: CTh showed subacute CVA, check MRI/MRA                                             3/23 EP: refused ILR, agreeable to cardio net  3/24 Med: HTN-->would increase Metoprolol to 50mg BID.   3/24 Cardio: Change Metoprolol to Labetalol, change Norvasc to Nifedipine. EP f/u for Cardionet. D/C planning.      3/25- As per attending, patient stable for discharge.

## 2017-03-24 NOTE — DISCHARGE NOTE ADULT - CARE PLAN
Principal Discharge DX:	Near syncope  Goal:	Prevent future episodes  Instructions for follow-up, activity and diet:	Cardionet,E/P f/u date?  Secondary Diagnosis:	Essential hypertension  Goal:	Control blood pressure  Instructions for follow-up, activity and diet:	Continue with current medications, follow up with your primary care provider in 1-2 weeks. Principal Discharge DX:	Near syncope  Goal:	Prevent future episodes  Instructions for follow-up, activity and diet:	Cardionet will be delivered to your house, and you will follow up with Dr. Palmer on 5/16/17 at 1:30pm  Secondary Diagnosis:	Essential hypertension  Goal:	Control blood pressure  Instructions for follow-up, activity and diet:	Continue with current medications, follow up with your primary care provider in 1-2 weeks.

## 2017-03-24 NOTE — DISCHARGE NOTE ADULT - PLAN OF CARE
Control blood pressure Prevent future episodes Cardionet,E/P f/u date? Continue with current medications, follow up with your primary care provider in 1-2 weeks. Cardionet will be delivered to your house, and you will follow up with Dr. Palmer on 5/16/17 at 1:30pm

## 2017-03-24 NOTE — DISCHARGE NOTE ADULT - CARE PROVIDERS DIRECT ADDRESSES
[email or web address]

## 2017-03-24 NOTE — DISCHARGE NOTE ADULT - MEDICATION SUMMARY - MEDICATIONS TO TAKE
I will START or STAY ON the medications listed below when I get home from the hospital:    aspirin 81 mg oral delayed release tablet  -- 1 tab(s) by mouth once a day  -- Indication: For Heart health    atorvastatin 40 mg oral tablet  -- 1 tab(s) by mouth once a day (at bedtime)  -- Indication: For HLD    labetalol 100 mg oral tablet  -- 1 tab(s) by mouth 3 times a day  -- Indication: For HTN    NIFEdipine 60 mg oral tablet, extended release  -- 1 tab(s) by mouth once a day  -- Indication: For HTN    ofloxacin 0.3% ophthalmic solution  -- 1 drop(s) to each affected eye once a day  in R eye  -- Indication: For Glaucoma    latanoprost 0.005% ophthalmic solution  -- 1 drop(s) to each affected eye once a day (in the evening) in left eye  -- Indication: For Glaucoma    Combigan 0.2%-0.5% ophthalmic solution  -- 1 drop(s) to each affected eye once a day  -- Indication: For Glaucoma

## 2017-03-24 NOTE — DISCHARGE NOTE ADULT - ADDITIONAL INSTRUCTIONS
EP appt with Dr. Palmer on 5/16/17 at 1:30pm Follow up with Cardiologist and PMD within one week of discharge. Call for appointment. Return to ED for any concerning symptoms. Continue medications as prescribed. Low salt, low fat, low cholesterol diet. EP appt with Dr. Palmer on 5/16/17 at 1:30pm

## 2017-03-25 VITALS
SYSTOLIC BLOOD PRESSURE: 148 MMHG | RESPIRATION RATE: 18 BRPM | OXYGEN SATURATION: 96 % | DIASTOLIC BLOOD PRESSURE: 73 MMHG | TEMPERATURE: 99 F | HEART RATE: 70 BPM

## 2017-03-25 PROBLEM — Z00.00 ENCOUNTER FOR PREVENTIVE HEALTH EXAMINATION: Status: ACTIVE | Noted: 2017-03-25

## 2017-03-25 LAB
BASOPHILS # BLD AUTO: 0.04 K/UL — SIGNIFICANT CHANGE UP (ref 0–0.2)
BASOPHILS NFR BLD AUTO: 0.6 % — SIGNIFICANT CHANGE UP (ref 0–2)
BUN SERPL-MCNC: 30 MG/DL — HIGH (ref 7–23)
CALCIUM SERPL-MCNC: 9.6 MG/DL — SIGNIFICANT CHANGE UP (ref 8.4–10.5)
CHLORIDE SERPL-SCNC: 104 MMOL/L — SIGNIFICANT CHANGE UP (ref 98–107)
CO2 SERPL-SCNC: 21 MMOL/L — LOW (ref 22–31)
CREAT SERPL-MCNC: 0.98 MG/DL — SIGNIFICANT CHANGE UP (ref 0.5–1.3)
EOSINOPHIL # BLD AUTO: 0.11 K/UL — SIGNIFICANT CHANGE UP (ref 0–0.5)
EOSINOPHIL NFR BLD AUTO: 1.6 % — SIGNIFICANT CHANGE UP (ref 0–6)
GLUCOSE SERPL-MCNC: 131 MG/DL — HIGH (ref 70–99)
HCT VFR BLD CALC: 37.7 % — SIGNIFICANT CHANGE UP (ref 34.5–45)
HGB BLD-MCNC: 12.3 G/DL — SIGNIFICANT CHANGE UP (ref 11.5–15.5)
IMM GRANULOCYTES NFR BLD AUTO: 0.1 % — SIGNIFICANT CHANGE UP (ref 0–1.5)
LYMPHOCYTES # BLD AUTO: 3.49 K/UL — HIGH (ref 1–3.3)
LYMPHOCYTES # BLD AUTO: 50.7 % — HIGH (ref 13–44)
MAGNESIUM SERPL-MCNC: 1.8 MG/DL — SIGNIFICANT CHANGE UP (ref 1.6–2.6)
MCHC RBC-ENTMCNC: 29.1 PG — SIGNIFICANT CHANGE UP (ref 27–34)
MCHC RBC-ENTMCNC: 32.6 % — SIGNIFICANT CHANGE UP (ref 32–36)
MCV RBC AUTO: 89.3 FL — SIGNIFICANT CHANGE UP (ref 80–100)
MONOCYTES # BLD AUTO: 0.77 K/UL — SIGNIFICANT CHANGE UP (ref 0–0.9)
MONOCYTES NFR BLD AUTO: 11.2 % — SIGNIFICANT CHANGE UP (ref 2–14)
NEUTROPHILS # BLD AUTO: 2.47 K/UL — SIGNIFICANT CHANGE UP (ref 1.8–7.4)
NEUTROPHILS NFR BLD AUTO: 35.8 % — LOW (ref 43–77)
PLATELET # BLD AUTO: 237 K/UL — SIGNIFICANT CHANGE UP (ref 150–400)
PMV BLD: 10.5 FL — SIGNIFICANT CHANGE UP (ref 7–13)
POTASSIUM SERPL-MCNC: 4.1 MMOL/L — SIGNIFICANT CHANGE UP (ref 3.5–5.3)
POTASSIUM SERPL-SCNC: 4.1 MMOL/L — SIGNIFICANT CHANGE UP (ref 3.5–5.3)
RBC # BLD: 4.22 M/UL — SIGNIFICANT CHANGE UP (ref 3.8–5.2)
RBC # FLD: 12.5 % — SIGNIFICANT CHANGE UP (ref 10.3–14.5)
SODIUM SERPL-SCNC: 142 MMOL/L — SIGNIFICANT CHANGE UP (ref 135–145)
WBC # BLD: 6.89 K/UL — SIGNIFICANT CHANGE UP (ref 3.8–10.5)
WBC # FLD AUTO: 6.89 K/UL — SIGNIFICANT CHANGE UP (ref 3.8–10.5)

## 2017-03-25 RX ORDER — LATANOPROST 0.05 MG/ML
1 SOLUTION/ DROPS OPHTHALMIC; TOPICAL
Qty: 0 | Refills: 0 | COMMUNITY

## 2017-03-25 RX ORDER — SODIUM CHLORIDE 9 MG/ML
3 INJECTION INTRAMUSCULAR; INTRAVENOUS; SUBCUTANEOUS EVERY 8 HOURS
Qty: 0 | Refills: 0 | Status: DISCONTINUED | OUTPATIENT
Start: 2017-03-25 | End: 2017-03-25

## 2017-03-25 RX ORDER — ATORVASTATIN CALCIUM 80 MG/1
1 TABLET, FILM COATED ORAL
Qty: 30 | Refills: 0 | OUTPATIENT
Start: 2017-03-25 | End: 2017-04-24

## 2017-03-25 RX ORDER — BRIMONIDINE TARTRATE, TIMOLOL MALEATE 2; 5 MG/ML; MG/ML
1 SOLUTION/ DROPS OPHTHALMIC
Qty: 0 | Refills: 0 | COMMUNITY

## 2017-03-25 RX ORDER — NIFEDIPINE 30 MG
1 TABLET, EXTENDED RELEASE 24 HR ORAL
Qty: 30 | Refills: 0 | OUTPATIENT
Start: 2017-03-25 | End: 2017-04-24

## 2017-03-25 RX ORDER — ASPIRIN/CALCIUM CARB/MAGNESIUM 324 MG
1 TABLET ORAL
Qty: 0 | Refills: 0 | COMMUNITY
Start: 2017-03-25

## 2017-03-25 RX ORDER — OFLOXACIN 0.3 %
1 DROPS OPHTHALMIC (EYE)
Qty: 0 | Refills: 0 | COMMUNITY

## 2017-03-25 RX ORDER — LABETALOL HCL 100 MG
1 TABLET ORAL
Qty: 90 | Refills: 0 | OUTPATIENT
Start: 2017-03-25 | End: 2017-04-24

## 2017-03-25 RX ADMIN — Medication 81 MILLIGRAM(S): at 12:24

## 2017-03-25 RX ADMIN — Medication 1 DROP(S): at 12:24

## 2017-03-25 RX ADMIN — BRIMONIDINE TARTRATE 1 DROP(S): 2 SOLUTION/ DROPS OPHTHALMIC at 12:24

## 2017-03-25 RX ADMIN — SODIUM CHLORIDE 3 MILLILITER(S): 9 INJECTION INTRAMUSCULAR; INTRAVENOUS; SUBCUTANEOUS at 05:38

## 2017-03-25 RX ADMIN — Medication 100 MILLIGRAM(S): at 12:24

## 2017-03-25 RX ADMIN — Medication 100 MILLIGRAM(S): at 05:38

## 2017-03-25 RX ADMIN — SODIUM CHLORIDE 3 MILLILITER(S): 9 INJECTION INTRAMUSCULAR; INTRAVENOUS; SUBCUTANEOUS at 12:17

## 2017-03-25 RX ADMIN — Medication 60 MILLIGRAM(S): at 05:38

## 2017-05-15 ENCOUNTER — RECORD ABSTRACTING (OUTPATIENT)
Age: 77
End: 2017-05-15

## 2017-05-15 DIAGNOSIS — H40.9 UNSPECIFIED GLAUCOMA: ICD-10-CM

## 2017-05-15 DIAGNOSIS — I10 ESSENTIAL (PRIMARY) HYPERTENSION: ICD-10-CM

## 2017-05-15 DIAGNOSIS — R55 SYNCOPE AND COLLAPSE: ICD-10-CM

## 2017-05-15 RX ORDER — LABETALOL HYDROCHLORIDE 100 MG/1
100 TABLET, FILM COATED ORAL
Qty: 270 | Refills: 3 | Status: ACTIVE | COMMUNITY

## 2017-05-15 RX ORDER — NIFEDIPINE 60 MG/1
60 TABLET, EXTENDED RELEASE ORAL DAILY
Refills: 0 | Status: ACTIVE | COMMUNITY

## 2017-05-15 RX ORDER — ATORVASTATIN CALCIUM 40 MG/1
40 TABLET, FILM COATED ORAL DAILY
Qty: 90 | Refills: 3 | Status: ACTIVE | COMMUNITY

## 2017-05-15 RX ORDER — ASPIRIN 81 MG
81 TABLET, DELAYED RELEASE (ENTERIC COATED) ORAL
Qty: 30 | Refills: 0 | Status: ACTIVE | COMMUNITY

## 2017-05-16 ENCOUNTER — APPOINTMENT (OUTPATIENT)
Dept: ELECTROPHYSIOLOGY | Facility: CLINIC | Age: 77
End: 2017-05-16

## 2021-02-17 NOTE — INPATIENT CERTIFICATION FOR MEDICARE PATIENTS - IN ORDER TO MEET MEDICARE REQUIREMENTS.
In order to meet Medicare requirements, the clinical documentation must support the information cited in the admission order.  Please be sure to provide detailed and clear documentation about the following in the admitting note/history and physical: normal

## 2021-03-02 NOTE — H&P ADULT. - PHYSICIAN'S ASSESSMENT OF RISK
Have You Had Microneedling Treatments Before?: has had a previous microneedling treatment
When Was Your Last Treatment?: 08/25/2020
Medium Risk

## 2021-10-06 PROBLEM — I10 ESSENTIAL HYPERTENSION: Status: ACTIVE | Noted: 2017-05-15
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
## 2022-05-20 NOTE — ED PROVIDER NOTE - CONSTITUTIONAL, MLM
left lower extremity swelling and pain normal... Well appearing, well nourished, awake, alert, oriented to person, place, time/situation and in no apparent distress.

## 2023-04-04 NOTE — PATIENT PROFILE ADULT. - EXTENSIONS OF SELF_ADULT
[Motor Strength] : muscle strength was normal in all four extremities [Sensation Tactile Decrease] : light touch was intact Eyeglasses [Abnormal Walk] : normal gait [2+] : Ankle jerk left 2+ [Simental] : Simental's sign was not demonstrated [L'Hermitte's] : neck flexion did not produce tingling down the spine/arms [Spurling's - Opposite Side] : Negative Spurling's on opposite side [Spurling's Same Side] : Negative Spurling's on same side

## 2023-05-26 NOTE — PATIENT PROFILE ADULT. - FUNCTIONAL SCREEN CURRENT LEVEL: COMMUNICATION, MLM
Problem: PHYSICAL THERAPY ADULT  Goal: Performs mobility at highest level of function for planned discharge setting  See evaluation for individualized goals  Description: Treatment/Interventions: Functional transfer training, LE strengthening/ROM, Elevations, Therapeutic exercise, Endurance training, Patient/family training, Bed mobility, Gait training, Spoke to nursing  Equipment Recommended: Ronnie Jett (pt has)       See flowsheet documentation for full assessment, interventions and recommendations  5/26/2023 1521 by Twila Sanford PT  Note: Prognosis: Good  Problem List: Decreased strength, Decreased endurance, Impaired balance, Decreased mobility, Impaired judgement, Impaired sensation  Assessment: Pt  81 y  o female presented w/ intractable nausea, vomiting & generalized weakness  Pt admitted for Adenocarcinoma of liver [facility]) w/   Atrial fibrillation with rapid ventricular response, pulmonary nodules, pulmonary HTN & pulmonary insufficiency  US & HIDA scan negative for acute cholecystitis  Pt referred to PT for mobility assessment & D/C planning w/ orders of Up and OOB as tolerated   Please see above for information re: home set-up & PLOF as well as objective findings during PT assessment  PTA, pt reports being fairly I w/ functional however require assistance w/ ADL's  Pt also admits to holding onto furnitures when amb in house  On eval, pt functioning below baseline hence will continue skilled PT to improve function & safety  Pt require S for bed mobility & transfers however require minAx1 for amb w/o AD + cues for techniques & safety  Gait deviations as above, slow w/ dec foot clearance & strides but no gross LOB noted  Dec amb tolerance 2* to fatigue  Please see flowsheet above for objective findings & levels of assistance required for all functional tasks tolerated during this tx session  The patient's AM-PAC Basic Mobility Inpatient Short Form Raw Score is 18   A Raw score of greater than 16 suggests the patient may benefit from discharge to home  Please also refer to the recommendation of the Physical Therapist for safe discharge planning  From PT standpoint, will anticipate safe return to home w/ family support when medically cleared  Will recommend HHPT at D/C  No SOB & dizziness reported t/o session  Nsg staff most recent vital signs as follows: /98   Pulse 90   Temp 98 2 °F (36 8 °C)   Resp 18   Wt 77 5 kg (170 lb 13 7 oz)   SpO2 92%   BMI 28 43 kg/m²   At end of session, pt back in bed in stable condition, call bell & phone in reach, bed alarm activated, all lines intact  Fall precautions reinforced w/ good understanding  CM to follow  Nsg staff to continue to mobilized pt (OOB in chair for all meals & ambulate in room/unit) as tolerated to prevent further decline in function  Nsg notified  PT Discharge Recommendation: Home with home health rehabilitation    See flowsheet documentation for full assessment  (0) understands/communicates without difficulty

## 2023-07-05 NOTE — ED ADULT NURSE NOTE - ASSIST WITH
standing/walking/toileting Aklief Pregnancy And Lactation Text: It is unknown if this medication is safe to use during pregnancy.  It is unknown if this medication is excreted in breast milk.  Breastfeeding women should use the topical cream on the smallest area of the skin for the shortest time needed while breastfeeding.  Do not apply to nipple and areola.

## 2024-01-17 NOTE — H&P ADULT. - PROBLEM SELECTOR PLAN 2
A balloon catheter was inserted. Supply used: (CATHETER BLN NC EMERGE MNRL 3.75MM 20MM 143CM 2 LUM TPR TIP). LAD Monitor BP, start pt an ACE-I and diuretic   DASH diet, 2g Na restriction DASH diet, 2g Na restriction  Monitor blood pressure closely  If HTN persists will start antihypertensives